# Patient Record
Sex: FEMALE | Race: WHITE | Employment: FULL TIME | URBAN - METROPOLITAN AREA
[De-identification: names, ages, dates, MRNs, and addresses within clinical notes are randomized per-mention and may not be internally consistent; named-entity substitution may affect disease eponyms.]

---

## 2019-05-17 ENCOUNTER — HOSPITAL ENCOUNTER (OUTPATIENT)
Dept: MAMMOGRAPHY | Age: 34
Discharge: HOME | End: 2019-05-19
Payer: COMMERCIAL

## 2019-05-17 ENCOUNTER — HOSPITAL ENCOUNTER (OUTPATIENT)
Dept: ULTRASOUND IMAGING | Age: 34
Discharge: HOME | End: 2019-05-17
Payer: COMMERCIAL

## 2019-05-17 DIAGNOSIS — N63.20 LEFT BREAST MASS: ICD-10-CM

## 2019-05-17 DIAGNOSIS — N63.0 BREAST LUMP: ICD-10-CM

## 2019-05-17 PROCEDURE — 77065 DX MAMMO INCL CAD UNI: CPT

## 2019-05-17 PROCEDURE — 76642 ULTRASOUND BREAST LIMITED: CPT

## 2021-03-28 ENCOUNTER — IMMUNIZATION (OUTPATIENT)
Dept: PRIMARY CARE CLINIC | Age: 36
End: 2021-03-28
Payer: COMMERCIAL

## 2021-03-28 PROCEDURE — 91301 COVID-19, MODERNA VACCINE 100MCG/0.5ML DOSE: CPT | Performed by: PHYSICIAN ASSISTANT

## 2021-03-28 PROCEDURE — 0011A COVID-19, MODERNA VACCINE 100MCG/0.5ML DOSE: CPT | Performed by: PHYSICIAN ASSISTANT

## 2021-04-27 ENCOUNTER — IMMUNIZATION (OUTPATIENT)
Dept: PRIMARY CARE CLINIC | Age: 36
End: 2021-04-27
Payer: COMMERCIAL

## 2021-04-27 PROCEDURE — 0012A COVID-19, MODERNA VACCINE 100MCG/0.5ML DOSE: CPT | Performed by: PHYSICIAN ASSISTANT

## 2021-04-27 PROCEDURE — 91301 COVID-19, MODERNA VACCINE 100MCG/0.5ML DOSE: CPT | Performed by: PHYSICIAN ASSISTANT

## 2022-05-02 ENCOUNTER — APPOINTMENT (OUTPATIENT)
Dept: GENERAL RADIOLOGY | Age: 37
End: 2022-05-02
Payer: COMMERCIAL

## 2022-05-02 ENCOUNTER — HOSPITAL ENCOUNTER (EMERGENCY)
Age: 37
Discharge: HOME | End: 2022-05-02
Attending: STUDENT IN AN ORGANIZED HEALTH CARE EDUCATION/TRAINING PROGRAM
Payer: COMMERCIAL

## 2022-05-02 VITALS
HEART RATE: 86 BPM | DIASTOLIC BLOOD PRESSURE: 73 MMHG | WEIGHT: 190 LBS | TEMPERATURE: 98 F | SYSTOLIC BLOOD PRESSURE: 132 MMHG | RESPIRATION RATE: 18 BRPM | HEIGHT: 65 IN | OXYGEN SATURATION: 97 % | BODY MASS INDEX: 31.65 KG/M2

## 2022-05-02 DIAGNOSIS — S93.401A SPRAIN OF RIGHT ANKLE, UNSPECIFIED LIGAMENT, INITIAL ENCOUNTER: Primary | ICD-10-CM

## 2022-05-02 PROCEDURE — 6370000000 HC RX 637 (ALT 250 FOR IP): Performed by: STUDENT IN AN ORGANIZED HEALTH CARE EDUCATION/TRAINING PROGRAM

## 2022-05-02 PROCEDURE — 73610 X-RAY EXAM OF ANKLE: CPT

## 2022-05-02 PROCEDURE — 99283 EMERGENCY DEPT VISIT LOW MDM: CPT

## 2022-05-02 RX ORDER — ACETAMINOPHEN 500 MG
500 TABLET ORAL 4 TIMES DAILY PRN
Qty: 40 TABLET | Refills: 0 | Status: SHIPPED | OUTPATIENT
Start: 2022-05-02

## 2022-05-02 RX ORDER — IBUPROFEN 600 MG/1
600 TABLET ORAL 4 TIMES DAILY PRN
Qty: 40 TABLET | Refills: 0 | Status: SHIPPED | OUTPATIENT
Start: 2022-05-02

## 2022-05-02 RX ORDER — ACETAMINOPHEN 500 MG
1000 TABLET ORAL ONCE
Status: COMPLETED | OUTPATIENT
Start: 2022-05-02 | End: 2022-05-02

## 2022-05-02 RX ADMIN — ACETAMINOPHEN 1000 MG: 500 TABLET ORAL at 08:19

## 2022-05-02 ASSESSMENT — PAIN SCALES - GENERAL: PAINLEVEL_OUTOF10: 8

## 2022-05-02 ASSESSMENT — PAIN DESCRIPTION - PAIN TYPE: TYPE: ACUTE PAIN

## 2022-05-02 ASSESSMENT — PAIN DESCRIPTION - FREQUENCY: FREQUENCY: CONTINUOUS

## 2022-05-02 ASSESSMENT — PAIN DESCRIPTION - ORIENTATION: ORIENTATION: RIGHT

## 2022-05-02 ASSESSMENT — PAIN DESCRIPTION - ONSET: ONSET: SUDDEN

## 2022-05-02 ASSESSMENT — PAIN DESCRIPTION - LOCATION: LOCATION: ANKLE

## 2022-05-02 ASSESSMENT — PAIN DESCRIPTION - DESCRIPTORS: DESCRIPTORS: THROBBING

## 2022-05-02 ASSESSMENT — PAIN - FUNCTIONAL ASSESSMENT: PAIN_FUNCTIONAL_ASSESSMENT: 0-10

## 2022-05-02 NOTE — Clinical Note
Horris Cranker was seen and treated in our emergency department on 5/2/2022. She may return to work on 05/04/2022. If you have any questions or concerns, please don't hesitate to call.       Chloé David, DO

## 2022-05-02 NOTE — Clinical Note
Mary Macias was seen and treated in our emergency department on 5/2/2022. She may return to work on 05/04/2022. If you have any questions or concerns, please don't hesitate to call.       Gordon Hernandez, DO

## 2022-05-02 NOTE — ED NOTES
Ace wrap to right ankle  Pt given crutches and states she has used them many times     Maylin Kerr, RN  05/02/22 [id number]

## 2022-05-02 NOTE — ED PROVIDER NOTES
Department of Emergency Medicine   ED  Provider Note  Admit Date/RoomTime: 5/2/2022  8:01 AM  ED Room: 04/04          History of Present Illness:  5/2/22, Time: 8:11 AM EDT  Chief Complaint   Patient presents with    Ankle Pain     fell this am and heard right ankle pop. Orlando Sorensen is a 39 y.o. female presenting to the ED for Right ankle pain. The patient reports she was walking out the landing to her garage. She slipped and rolled her ankle. She fell on the ground but did not have any other injuries. She denies any hip or back pain. Denies hitting her head. There was no loss of consciousness. She has pain in the right lateral aspect of her foot. Not makes it better or worse it is constant duration. It is an aching sensation. She not take any medication prior to arrival.  Denies any numbness or paresthesias in the foot. Review of Systems:  Review of systems obtained and negative unless stated otherwise above in the HPI.    --------------------------------------------- PAST HISTORY ---------------------------------------------  Past Medical History:  has a past medical history of Gallstone and GERD (gastroesophageal reflux disease). Past Surgical History:  has no past surgical history on file. Social History:  reports that she has never smoked. She has never used smokeless tobacco. She reports that she does not drink alcohol and does not use drugs. Family History: family history is not on file. . Unless otherwise noted, family history is non contributory    The patients home medications have been reviewed. Allergies: Patient has no known allergies.     I have reviewed the past medical history, past surgical history, social history, and family history    ---------------------------------------------------PHYSICAL EXAM--------------------------------------    Constitutional: Appears in no distress  Head: Normocephalic, atraumatic  Eyes: Non-icteric slcera, no conjunctival injection  ENT: Moist mucous membranes,  Neck: Trachea midline, no JVD  Respiratory: Nonlabored respirations. Lungs clear to auscultation bilaterally, no wheezes, rales, or rhonchi. Cardiovascular: Regular rate. Regular rhythm. No murmurs, no gallops, no rubs. Gastrointestinal: Abdomen Soft, Non tender, Non distended. No rebound tenderness, guarding, or rigidity. Extremities: No lower extremity edema  Genitourinary: No CVA tenderness, no suprapubic tenderness  Musculoskeletal: Pain to palpation of the right lateral ankle, patient would not tolerate anterior drawer for laxity assessment, there is slight pain at the medial malleoli region, there is 2 out of 4 dorsalis pedis as well as posterior tibial pulses noted, sensory is intact light touch, patient is able to plantarflex and dorsiflex the foot  Skin: Pink, warm, dry without rash. Neurologic: Alert, symmetric facies, no aphasia    -------------------------------------------------- RESULTS -------------------------------------------------  I have personally reviewed all laboratory and imaging results for this patient. Results are listed below. LABS: (Lab results interpreted by me)  No results found for this visit on 05/02/22.,       RADIOLOGY:  Interpreted by Radiologist unless otherwise specified  XR ANKLE RIGHT (MIN 3 VIEWS)   Final Result   No acute abnormality of the ankle.               ------------------------- NURSING NOTES AND VITALS REVIEWED ---------------------------   The nursing notes within the ED encounter and vital signs as below have been reviewed by myself  /73   Pulse 86   Temp 98 °F (36.7 °C) (Temporal)   Resp 18   Ht 5' 5\" (1.651 m)   Wt 190 lb (86.2 kg)   LMP 04/28/2022   SpO2 97%   BMI 31.62 kg/m²     Oxygen Saturation Interpretation: Normal    The patients available past medical records and past encounters were reviewed.         ------------------------------ ED COURSE/MEDICAL DECISION MAKING----------------------  Medications   acetaminophen (TYLENOL) tablet 1,000 mg (1,000 mg Oral Given 5/2/22 0819)        Re-Evaluations: This patient's ED course included:a personal history and physicial examination and re-evaluation prior to disposition    This patient has remained hemodynamically stable during their ED course. Consultations:  None    Medical Decision Making:   Patient presents emergency department with right ankle injury. X-rays were obtained. She declines a pregnancy test today. She will be given Tylenol per her request for pain. Imaging reviewed unremarkable for any acute concerning osseous abnormalities. The patient will be placed in an Ace wrap. She is given crutches. She will be discharged with pain medication. She is educated on expectant management should follow-up as an outpatient. If her symptoms or not proving she could require more advanced imaging as well as possible repeat x-rays. Counseling: The emergency provider has spoken with the patient and discussed todays results, in addition to providing specific details for the plan of care and counseling regarding the diagnosis and prognosis. Questions are answered at this time and they are agreeable with the plan.       --------------------------------- IMPRESSION AND DISPOSITION ---------------------------------    IMPRESSION  1. Sprain of right ankle, unspecified ligament, initial encounter        DISPOSITION  Disposition: Discharge to home  Patient condition is stable    IDr. Onelia, am the primary provider of record    Onelia Li DO  Emergency Medicine    NOTE: This report was transcribed using voice recognition software.  Every effort was made to ensure accuracy; however, inadvertent computerized transcription errors may be present         Colette Osman, DO  05/02/22 1842 Tobi Fajardo, DO  05/02/22 1645

## 2023-07-13 ENCOUNTER — PATIENT OUTREACH (OUTPATIENT)
Dept: CARE COORDINATION | Facility: CLINIC | Age: 38
End: 2023-07-13

## 2023-07-13 NOTE — PROGRESS NOTES
Outreach call to patient to support a smooth transition of care from recent admission.  Left voicemail message for patient with my contact information.    Carley Brooks RN/CM

## 2023-08-01 ENCOUNTER — PATIENT OUTREACH (OUTPATIENT)
Dept: CARE COORDINATION | Facility: CLINIC | Age: 38
End: 2023-08-01

## 2023-08-01 NOTE — PROGRESS NOTES
Outreach call to patient to support a smooth transition of care from recent admission.  Patient states, she is doing good. Completed antibiotics. Patient states, she is scheduled to have cholecystectomy on 8/21/2023.  Will continue to monitor through transition period.    Carley Brooks, UTE/CM

## 2023-08-15 ENCOUNTER — PATIENT OUTREACH (OUTPATIENT)
Dept: CARE COORDINATION | Facility: CLINIC | Age: 38
End: 2023-08-15

## 2023-08-15 NOTE — PROGRESS NOTES
Outreach call to patient to support a smooth transition of care from recent admission.  Patient states, she is doing good. Patient scheduled to have a laparoscopic cholecystectomy 8/21/2023. No needs at this time. CM will no longer follow.    Carley Brooks RN/CM

## 2023-08-21 ENCOUNTER — HOSPITAL ENCOUNTER (OUTPATIENT)
Dept: DATA CONVERSION | Facility: HOSPITAL | Age: 38
End: 2023-08-21
Attending: SURGERY | Admitting: SURGERY

## 2023-08-21 DIAGNOSIS — K80.00 CALCULUS OF GALLBLADDER WITH ACUTE CHOLECYSTITIS WITHOUT OBSTRUCTION: ICD-10-CM

## 2023-08-21 LAB
GRAM STAIN: NORMAL
STERILE FLUID CULTURE/SMEAR: NORMAL

## 2023-08-22 LAB — HCG, URINE: NEGATIVE

## 2023-08-23 LAB
GRAM STAIN: NORMAL
TISSUE/WOUND CULTURE/SMEAR: NORMAL

## 2023-08-31 LAB
COMPLETE PATHOLOGY REPORT: NORMAL
CONVERTED CLINICAL DIAGNOSIS-HISTORY: NORMAL
CONVERTED FINAL DIAGNOSIS: NORMAL
CONVERTED FINAL REPORT PDF LINK TO COPY AND PASTE: NORMAL
CONVERTED GROSS DESCRIPTION: NORMAL

## 2023-09-21 ENCOUNTER — TELEPHONE (OUTPATIENT)
Age: 38
End: 2023-09-21

## 2023-09-21 DIAGNOSIS — B37.9 YEAST INFECTION: Primary | ICD-10-CM

## 2023-09-21 RX ORDER — FLUCONAZOLE 150 MG/1
150 TABLET ORAL ONCE
Qty: 1 TABLET | Refills: 0 | Status: SHIPPED | OUTPATIENT
Start: 2023-09-21 | End: 2023-09-21

## 2023-09-21 NOTE — TELEPHONE ENCOUNTER
1. Why is patient calling? Pain:   Yes vaginal/ groin                Bleeding:   no                 Other:   Vaginal itching/pain                   When did the problem start (time and date)? 9/18/23    2. Is patient pregnant?  no    3. If patient is complaining of pain, COMPLETE ALL OF THESE:       Yeast infection symptoms, was treated topically and with vaginal suppository. Would like to try oral    4. Does the patient have a fever?   no    5. Does the patient have chills? No    6. Is the patient calling for a refill on medication? No    AT END OF ENCOUNTER, IF PATIENT IS COMPLAINING OF PAIN LEVEL 5 OR HIGHER OR BLEEDING SO AS TO SOAK A PAD AN HOUR, LET DOCTOR KNOW IMMEDIATELY, SO A DISPOSITION CAN BE MADE.     IF PATIENT IS PREGNANT AND OVER 20 WEEKS, AND CALLING WITH HEADACHE, CONTRACTIONS,BLEEDING,LEAKING FLUID,CHEST PAIN, OR DIFFICULTY BREATHING, LET DOCTOR KNOW RIGHT AWAY       Will pull chart pharmacy correct

## 2023-09-29 VITALS
TEMPERATURE: 97.7 F | WEIGHT: 171.96 LBS | HEART RATE: 72 BPM | RESPIRATION RATE: 14 BRPM | HEIGHT: 66 IN | DIASTOLIC BLOOD PRESSURE: 52 MMHG | BODY MASS INDEX: 27.64 KG/M2 | SYSTOLIC BLOOD PRESSURE: 114 MMHG

## 2023-09-30 NOTE — H&P
History of Present Illness:   Pregnant/Lactating:  ·  Are You Pregnant no   ·  Are You Currently Breastfeeding no     History Present Illness:  Reason for surgery: Acute cholecystitis with cholelithiasis   HPI:    Randal is a 37-year-old white female who had previously been admitted for acute cholecystitis with cholelithiasis.  Because she had presented with almost a week of  symptoms, surgical intervention was not recommended at that time due to the increased risk of perioperative complications.  She declined a cholecystostomy tube.  Instead, she was treated with IV antibiotics and her symptoms improved within several days.   She now presents for delayed cholecystectomy.    Allergies:        Allergies:  ·  No Known Allergies :     Home Medication Review:   Home Medications Reviewed: yes     Impression/Procedure:   ·  Impression and Planned Procedure: Laparoscopic cholecystectomy       ERAS (Enhanced Recovery After Surgery):  ·  ERAS Patient: no       Vital Signs:  Temperature C: 36.5 degrees C   Temperature F: 97.7 degrees F   Heart Rate: 72 beats per minute   Respiratory Rate: 14 breath per minute   Blood Pressure Systolic: 114 mm/Hg   Blood Pressure Diastolic: 52 mm/Hg     Physical Exam by System:    Respiratory/Thorax: Clear to auscultation bilaterally   Cardiovascular: Regular rate and rhythm     Consent:   COVID-19 Consent:  ·  COVID-19 Risk Consent Surgeon has reviewed key risks related to the risk of noe COVID-19 and if they contract COVID-19 what the risks are.       Electronic Signatures:  Eun Dawson)  (Signed 21-Aug-2023 08:04)   Authored: History of Present Illness, Allergies, Home  Medication Review, Impression/Procedure, ERAS, Physical Exam, Consent, Note Completion      Last Updated: 21-Aug-2023 08:04 by Eun Dawson)

## 2023-10-01 NOTE — OP NOTE
PROCEDURE DETAILS    Preoperative Diagnosis:  Cholelithiasis with acute cholecystitis without obstruction, K80.00    Postoperative Diagnosis:  Cholelithiasis with acute cholecystitis without obstruction, K80.00    Surgeon: Eun Dawson  Resident/Fellow/Other Assistant: Sujata Victoria    Procedure:  1. Cholecystectomy Laparoscopic WITH CHOLANGIOGRAM    Anesthesia: No anesthesiologist associated with this case  Estimated Blood Loss: 10  Findings: Acutely inflamed and large gallbladder.  Posterior gallbladder artery.  Cholangiogram with normal ductal anatomy.  Specimens(s) Collected: yes,  Gallbladder   Complications: None  IV Fluids: 1600  Patient Returned To/Condition: To PACU in stable condition        Operative Report:   Indication for the procedure: Randal is a 37-year-old white female who presented with 7 days of right upper quadrant pain.  She was originally diagnosed with  acute cholecystitis with cholelithiasis when she was in [place], but decided to come home before treatment.  She was admitted to the hospital and given IV antibiotics.  She now presents for delayed cholecystectomy. Radiographic evaluation showed dilated  gallbladder with a thick gallbladder wall and a large gallstone. Preoperative LFTs were within normal limits. The benefits and risks of the laparoscopic cholecystectomy were reviewed with the patient who acknowledged understanding and signed consent.    Details of procedure: The patient was brought into the operating room and was kelly in the supine position. After placement under general anesthesia, SEBAS hose and SCDs were placed on bilateral lower extremities. The abdomen was prepped with ChloraPrep  and draped in the usual sterile fashion. A pause was taken and the correct patient and procedure were identified.    After injection of half percent Marcaine with epinephrine, a 1 cm periumbilical incision was made just above the level of the umbilicus. This was carried down to the level  of the anterior fascia which was grasped with Kochars and opened under direct visualization.   After placement of the Davis port, and confirmation of intra-abdominal placement, the abdomen was insufflated up to 15 mmHg. On quick examination with the scope, there was no evidence of any injury during entry. The parts of the large and small bowel  that were visualized all appeared within normal limits. The pelvis region could not be seen. No obvious ventral wall hernias. Attention now was placed towards the right upper quadrant. The liver appeared normal. The patient now was placed in reverse Trendelenburg  with slight right-sided up. All other ports were placed after local anesthesia was injected. A 5 mm port was placed into the epigastrium, and two 5mm ports were placed into the right upper quadrant.    The fundus of the gallbladder was found to be thickened and edematous.  It initially could not be grasped.  A decompression needle was used to decompress the gallbladder.  A thick, mucousy fluid was found in the gallbladder consistent with hydrops.  At  this time the gallbladder was identified, grasped and retracted in a cephalad position. The infundibulum of the gallbladder was grasped and retracted out laterally.  The infundibulum was difficult to graft due to a very large stone.  There were a moderate  amount of omental adhesions to the gallbladder which were taken down using a combination of cautery and blunt dissection.  The peritoneum was opened up around the infundibulum of the gallbladder both anteriorly and posteriorly using blunt dissection.   She was found to have an enlarged lymph node anterior to the cystic artery.  This was eventually dissected off of the cystic artery.  The cystic duct and the cystic artery were eventually isolated.  The cystic artery could be seen extending up onto the  gallbladder.  As the undersurface of the gallbladder continued to be dissected, there appeared to be a posterior  artery fairly close to the gallbladder fossa bed.  This was dissected out as well.  Eventually the gallbladder fossa bed was identified.   Since there was an additional posterior artery (this was well lateral to the original cystic artery and the cystic duct, a cholangiogram was performed to identify anatomy.  The 2 cystic arteries were clipped but not cut.  1 clip was placed on the patient's  side of the cystic duct, and a small hole was made in the cystic duct.  A small amount of mucus-like material was removed.  Using a 4 Luxembourger whistle-tip cholangiogram catheter and clamp a cholangiogram was performed under fluoroscopy and using full-strength  Conray.  The cholangiogram showed that the cystic duct was cannulated.  There was good visualization of the common hepatic and common bile ducts.  There was good flow into the duodenum without any filling defects.  At this time 2 additional clips were  placed on the patient's side of the cystic duct and all structures were cut.  The gallbladder then was removed from the gallbladder fossa bed using cautery. No other tubular structures were encountered during the dissection. Once the gallbladder was off  of the gallbladder fossa bed, it was removed from the intra-abdominal cavity through the periumbilical port with an Endo Catch bag.    The abdomen was re-insufflated and the area of the gallbladder fossa bed was inspected. There was no bleeding from the liver bed. The clips appeared in good position. The area both above and below the liver then was irrigated until clear. The ports then  were removed under direct visualization, without any evidence of bleeding from the port sites. The pneumoperitoneum was released and suctioned out. The fascia of the periumbilical port was closed using a figure-of-eight stitch of 0 Vicryl suture. The  remaining Marcaine was injected into the fascia of the periumbilical port. All wounds were closed using interrupted stitches of 4-0 Vicryl and  dressed with Steri-Strips and Band-Aids. The patient tolerated the procedure well.    Counts: Correct ×2.  Complications: None.  Drains: None.                        Attestation:   Note Completion:  Attending Attestation I was present for the entire procedure         Electronic Signatures:  Eun Dawson)  (Signed 21-Aug-2023 10:50)   Authored: Post-Operative Note, Chart Review, Note Completion      Last Updated: 21-Aug-2023 10:50 by Eun Dawson)

## 2024-01-20 NOTE — PROGRESS NOTES
Lucas Gutierrez     Patient presents for breakthrough bleeding since October on Sprintec.  No problems with her bowel or urinary function.  No hot flashes.  No problems with intercourse.  Does notice increasing cramping.  No abnormal discharge.  Had a normal Pap smear in July.  Has history of septate uterus.    No personal or family history of VTE.    Family history: Mother with endometriosis.  Maternal grandmother with ovarian cancer.          Past Medical History:   Diagnosis Date    Gallstone     GERD (gastroesophageal reflux disease)         No past surgical history on file.     No family history on file.     Social History       Tobacco History       Smoking Status  Never      Smokeless Tobacco Use  Never              Alcohol History       Alcohol Use Status  No              Drug Use       Drug Use Status  No              Sexual Activity       Sexually Active  Not Asked                      Current Outpatient Medications:     ethynodiol-ethinyl estradiol (ZOVIA) 1-50 MG-MCG per tablet, Take 1 tablet by mouth daily, Disp: 3 packet, Rfl: 4    ibuprofen (ADVIL;MOTRIN) 600 MG tablet, Take 1 tablet by mouth 4 times daily as needed for Pain, Disp: 40 tablet, Rfl: 0    acetaminophen (TYLENOL) 500 MG tablet, Take 1 tablet by mouth 4 times daily as needed for Pain, Disp: 40 tablet, Rfl: 0     No Known Allergies     Review of Systems   Constitutional:  Negative for appetite change, chills and fever.   Gastrointestinal:  Negative for abdominal distention, abdominal pain, blood in stool, constipation, diarrhea, nausea, rectal pain and vomiting.   Endocrine: Negative for heat intolerance, polydipsia, polyphagia and polyuria.   Genitourinary:  Positive for menstrual problem and pelvic pain. Negative for decreased urine volume, difficulty urinating, dysuria, flank pain, frequency, genital sores, hematuria and urgency.   Hematological:  Negative for adenopathy. Does not bruise/bleed easily.        Vitals:    01/25/24 1542   BP:

## 2024-01-25 ENCOUNTER — HOSPITAL ENCOUNTER (OUTPATIENT)
Age: 39
Discharge: HOME | End: 2024-01-25
Payer: COMMERCIAL

## 2024-01-25 ENCOUNTER — OFFICE VISIT (OUTPATIENT)
Age: 39
End: 2024-01-25
Payer: COMMERCIAL

## 2024-01-25 VITALS
HEIGHT: 65 IN | WEIGHT: 177.3 LBS | HEART RATE: 83 BPM | BODY MASS INDEX: 29.54 KG/M2 | SYSTOLIC BLOOD PRESSURE: 122 MMHG | DIASTOLIC BLOOD PRESSURE: 84 MMHG

## 2024-01-25 DIAGNOSIS — N92.0 EXCESSIVE OR FREQUENT MENSTRUATION: ICD-10-CM

## 2024-01-25 DIAGNOSIS — N92.0 EXCESSIVE OR FREQUENT MENSTRUATION: Primary | ICD-10-CM

## 2024-01-25 DIAGNOSIS — N92.1 METRORRHAGIA: ICD-10-CM

## 2024-01-25 LAB
ERYTHROCYTE [DISTWIDTH] IN BLOOD BY AUTOMATED COUNT: 12.6 % (ref 11.5–15)
HCT VFR BLD AUTO: 41.2 % (ref 34–48)
HGB BLD-MCNC: 13.7 G/DL (ref 11.5–15.5)
MCH RBC QN AUTO: 31.1 PG (ref 26–35)
MCHC RBC AUTO-ENTMCNC: 33.3 G/DL (ref 32–34.5)
MCV RBC AUTO: 93.6 FL (ref 80–99.9)
PLATELET # BLD AUTO: 325 K/UL (ref 130–450)
PMV BLD AUTO: 11.4 FL (ref 7–12)
RBC # BLD AUTO: 4.4 M/UL (ref 3.5–5.5)
TSH SERPL DL<=0.05 MIU/L-ACNC: 1.18 UIU/ML (ref 0.27–4.2)
WBC OTHER # BLD: 10.6 K/UL (ref 4.5–11.5)

## 2024-01-25 PROCEDURE — 36415 COLL VENOUS BLD VENIPUNCTURE: CPT

## 2024-01-25 PROCEDURE — 99214 OFFICE O/P EST MOD 30 MIN: CPT | Performed by: LEGAL MEDICINE

## 2024-01-25 PROCEDURE — 84443 ASSAY THYROID STIM HORMONE: CPT

## 2024-01-25 PROCEDURE — 85027 COMPLETE CBC AUTOMATED: CPT

## 2024-01-25 RX ORDER — ETHYNODIOL DIACETATE AND ETHINYL ESTRADIOL 1 MG-50MCG
1 KIT ORAL DAILY
Qty: 3 PACKET | Refills: 4 | Status: SHIPPED | OUTPATIENT
Start: 2024-01-25

## 2024-01-25 RX ORDER — NORGESTIMATE AND ETHINYL ESTRADIOL 0.25-0.035
1 KIT ORAL DAILY
COMMUNITY
Start: 2023-11-10 | End: 2024-01-25

## 2024-01-25 SDOH — ECONOMIC STABILITY: INCOME INSECURITY: HOW HARD IS IT FOR YOU TO PAY FOR THE VERY BASICS LIKE FOOD, HOUSING, MEDICAL CARE, AND HEATING?: NOT HARD AT ALL

## 2024-01-25 SDOH — ECONOMIC STABILITY: HOUSING INSECURITY
IN THE LAST 12 MONTHS, WAS THERE A TIME WHEN YOU DID NOT HAVE A STEADY PLACE TO SLEEP OR SLEPT IN A SHELTER (INCLUDING NOW)?: NO

## 2024-01-25 SDOH — ECONOMIC STABILITY: FOOD INSECURITY: WITHIN THE PAST 12 MONTHS, THE FOOD YOU BOUGHT JUST DIDN'T LAST AND YOU DIDN'T HAVE MONEY TO GET MORE.: NEVER TRUE

## 2024-01-25 SDOH — ECONOMIC STABILITY: FOOD INSECURITY: WITHIN THE PAST 12 MONTHS, YOU WORRIED THAT YOUR FOOD WOULD RUN OUT BEFORE YOU GOT MONEY TO BUY MORE.: NEVER TRUE

## 2024-01-25 ASSESSMENT — PATIENT HEALTH QUESTIONNAIRE - PHQ9
SUM OF ALL RESPONSES TO PHQ QUESTIONS 1-9: 0
SUM OF ALL RESPONSES TO PHQ QUESTIONS 1-9: 0
2. FEELING DOWN, DEPRESSED OR HOPELESS: 0
1. LITTLE INTEREST OR PLEASURE IN DOING THINGS: 0
SUM OF ALL RESPONSES TO PHQ QUESTIONS 1-9: 0
SUM OF ALL RESPONSES TO PHQ9 QUESTIONS 1 & 2: 0
SUM OF ALL RESPONSES TO PHQ QUESTIONS 1-9: 0

## 2024-01-25 ASSESSMENT — ENCOUNTER SYMPTOMS
VOMITING: 0
CONSTIPATION: 0
BLOOD IN STOOL: 0
DIARRHEA: 0
RECTAL PAIN: 0
NAUSEA: 0
ABDOMINAL PAIN: 0
ABDOMINAL DISTENTION: 0

## 2024-01-25 NOTE — PATIENT INSTRUCTIONS
Please have the studies ordered in the next  4 weeks.    For ultrasound or any x-rays, you can have them done at [facility].  You will need to call the radiology department to schedule those.  Please make sure my staff gives you the phone number for radiology before you leave.  If you opt to have the studies done at a different facility, please ask that facility to fax your results to my office.  The fax number here is: [phone].    For labs, you may have them done at [facility].  They are open 8 AM to 5 PM Monday through Friday.  If you opt to have your labs done at a different facility, please ask the facility to fax your results to my office.  The fax number here is: [phone].    I will call you with the reports.      Please call the office if I have not contacted you with the reports by 2 weeks after you have had them done.    Allow 3 months for the new pill to regulate the cycles.

## 2024-02-26 ENCOUNTER — TELEPHONE (OUTPATIENT)
Age: 39
End: 2024-02-26

## 2024-02-26 ENCOUNTER — HOSPITAL ENCOUNTER (OUTPATIENT)
Dept: ULTRASOUND IMAGING | Age: 39
Discharge: HOME | End: 2024-02-26
Attending: LEGAL MEDICINE
Payer: COMMERCIAL

## 2024-02-26 DIAGNOSIS — N92.1 METRORRHAGIA: ICD-10-CM

## 2024-02-26 DIAGNOSIS — N92.0 EXCESSIVE OR FREQUENT MENSTRUATION: ICD-10-CM

## 2024-02-26 PROCEDURE — 76856 US EXAM PELVIC COMPLETE: CPT

## 2024-02-26 NOTE — TELEPHONE ENCOUNTER
----- Message from Alee Barney MD sent at 2/26/2024  2:30 PM EST -----  Please call patient. 2/26/24    Report shows possible septum in the uterus.  If breakthrough bleeding continues on the higher dose pill, let me know, and we will order an MRI to further elucidate this issue.  Otherwise, keep follow-up appointment.        Thank you

## 2024-02-26 NOTE — RESULT ENCOUNTER NOTE
Please call patient. 2/26/24    Report shows possible septum in the uterus.  If breakthrough bleeding continues on the higher dose pill, let me know, and we will order an MRI to further elucidate this issue.  Otherwise, keep follow-up appointment.        Thank you

## 2024-02-26 NOTE — TELEPHONE ENCOUNTER
Pt notified and verbalized understanding, denies break through bleeding at this time. Scheduled for follow up.

## 2024-03-28 ENCOUNTER — TELEPHONE (OUTPATIENT)
Age: 39
End: 2024-03-28

## 2024-03-28 RX ORDER — NORGESTIMATE AND ETHINYL ESTRADIOL 0.25-0.035
1 KIT ORAL DAILY
Qty: 3 PACKET | Refills: 4 | Status: SHIPPED | OUTPATIENT
Start: 2024-03-28

## 2024-03-28 NOTE — TELEPHONE ENCOUNTER
Patient states yes she would like to go back on Sprintec and would like this to be sent to [facility] on [address] in [place]. Pharmacy updated in system.

## 2024-03-28 NOTE — TELEPHONE ENCOUNTER
Pt called she is currently taking Zovia it Is giving her massive headaches. She wanted to know if you could prescribe her a different birth control. She has no personal or family hx of blood clots or DVT. She does not smoke, Her pharmacy is [facility] on [address].

## 2024-07-02 ENCOUNTER — TELEPHONE (OUTPATIENT)
Age: 39
End: 2024-07-02

## 2024-07-02 NOTE — TELEPHONE ENCOUNTER
Left detailed VM informing pt annual for 7/25 would be cancelled due to provider resigning. Ph# provided for any questions. Letter sent.